# Patient Record
Sex: MALE | Race: WHITE | URBAN - NONMETROPOLITAN AREA
[De-identification: names, ages, dates, MRNs, and addresses within clinical notes are randomized per-mention and may not be internally consistent; named-entity substitution may affect disease eponyms.]

---

## 2019-12-16 ENCOUNTER — OFFICE VISIT (OUTPATIENT)
Dept: URBAN - NONMETROPOLITAN AREA CLINIC 10 | Facility: CLINIC | Age: 69
End: 2019-12-16
Payer: MEDICARE

## 2019-12-16 DIAGNOSIS — H52.4 PRESBYOPIA: ICD-10-CM

## 2019-12-16 PROCEDURE — 92004 COMPRE OPH EXAM NEW PT 1/>: CPT | Performed by: OPTOMETRIST

## 2019-12-16 ASSESSMENT — KERATOMETRY
OS: 46.00
OD: 45.25

## 2019-12-16 ASSESSMENT — INTRAOCULAR PRESSURE
OD: 14
OS: 14

## 2019-12-16 ASSESSMENT — VISUAL ACUITY
OS: 20/40
OD: 20/40

## 2019-12-16 NOTE — IMPRESSION/PLAN
Impression: Age-related nuclear cataract, bilateral: H25.13. Plan: Discussed diagnosis in detail. I recommended cataract surgery. Consult with Dr. Angelia Pate for pre-op for cataract surgery.

## 2020-02-14 ENCOUNTER — OFFICE VISIT (OUTPATIENT)
Dept: URBAN - NONMETROPOLITAN AREA CLINIC 10 | Facility: CLINIC | Age: 70
End: 2020-02-14
Payer: MEDICARE

## 2020-02-14 DIAGNOSIS — H25.13 AGE-RELATED NUCLEAR CATARACT, BILATERAL: Primary | ICD-10-CM

## 2020-02-14 PROCEDURE — 99204 OFFICE O/P NEW MOD 45 MIN: CPT | Performed by: OPHTHALMOLOGY

## 2020-02-14 RX ORDER — OFLOXACIN 3 MG/ML
0.3 % SOLUTION/ DROPS OPHTHALMIC
Qty: 5 | Refills: 1 | Status: INACTIVE
Start: 2020-02-14 | End: 2020-07-22

## 2020-02-14 RX ORDER — PREDNISOLONE ACETATE 10 MG/ML
1 % SUSPENSION/ DROPS OPHTHALMIC
Qty: 5 | Refills: 1 | Status: INACTIVE
Start: 2020-02-14 | End: 2020-06-05

## 2020-02-14 ASSESSMENT — INTRAOCULAR PRESSURE
OD: 18
OS: 17

## 2020-02-14 ASSESSMENT — KERATOMETRY: OD: 44.88

## 2020-02-14 ASSESSMENT — VISUAL ACUITY
OS: 20/50
OD: 20/50

## 2020-03-10 ENCOUNTER — Encounter (OUTPATIENT)
Dept: URBAN - NONMETROPOLITAN AREA CLINIC 6 | Facility: CLINIC | Age: 70
End: 2020-03-10
Payer: MEDICARE

## 2020-03-18 ENCOUNTER — SURGERY (OUTPATIENT)
Dept: URBAN - NONMETROPOLITAN AREA SURGERY 1 | Facility: SURGERY | Age: 70
End: 2020-03-18
Payer: MEDICARE

## 2020-03-18 PROCEDURE — 66984 XCAPSL CTRC RMVL W/O ECP: CPT | Performed by: OPHTHALMOLOGY

## 2020-03-19 ENCOUNTER — POST OP (OUTPATIENT)
Dept: URBAN - NONMETROPOLITAN AREA CLINIC 6 | Facility: CLINIC | Age: 70
End: 2020-03-19

## 2020-03-19 PROCEDURE — 99024 POSTOP FOLLOW-UP VISIT: CPT | Performed by: OPTOMETRIST

## 2020-03-19 ASSESSMENT — INTRAOCULAR PRESSURE: OS: 21

## 2020-03-24 ENCOUNTER — POST OP (OUTPATIENT)
Dept: URBAN - NONMETROPOLITAN AREA CLINIC 6 | Facility: CLINIC | Age: 70
End: 2020-03-24

## 2020-03-24 PROCEDURE — 99024 POSTOP FOLLOW-UP VISIT: CPT | Performed by: OPTOMETRIST

## 2020-03-24 ASSESSMENT — VISUAL ACUITY
OD: 20/50
OS: 20/20

## 2020-03-24 ASSESSMENT — INTRAOCULAR PRESSURE
OD: 13
OS: 14

## 2020-04-08 ENCOUNTER — POST OP (OUTPATIENT)
Dept: URBAN - NONMETROPOLITAN AREA CLINIC 6 | Facility: CLINIC | Age: 70
End: 2020-04-08

## 2020-04-08 PROCEDURE — 99024 POSTOP FOLLOW-UP VISIT: CPT | Performed by: OPHTHALMOLOGY

## 2020-04-08 ASSESSMENT — VISUAL ACUITY
OD: 20/50
OS: 20/25

## 2020-04-08 ASSESSMENT — INTRAOCULAR PRESSURE
OD: 15
OS: 16

## 2020-05-13 ENCOUNTER — SURGERY (OUTPATIENT)
Dept: URBAN - NONMETROPOLITAN AREA SURGERY 1 | Facility: SURGERY | Age: 70
End: 2020-05-13
Payer: MEDICARE

## 2020-05-13 PROCEDURE — 66984 XCAPSL CTRC RMVL W/O ECP: CPT | Performed by: OPHTHALMOLOGY

## 2020-05-14 ENCOUNTER — POST OP (OUTPATIENT)
Dept: URBAN - NONMETROPOLITAN AREA CLINIC 6 | Facility: CLINIC | Age: 70
End: 2020-05-14

## 2020-05-14 DIAGNOSIS — Z96.1 PRESENCE OF INTRAOCULAR LENS: Primary | ICD-10-CM

## 2020-05-14 PROCEDURE — 99024 POSTOP FOLLOW-UP VISIT: CPT | Performed by: OPTOMETRIST

## 2020-05-14 ASSESSMENT — INTRAOCULAR PRESSURE: OD: 22

## 2020-05-29 ENCOUNTER — POST-OPERATIVE VISIT (OUTPATIENT)
Dept: URBAN - NONMETROPOLITAN AREA CLINIC 10 | Facility: CLINIC | Age: 70
End: 2020-05-29

## 2020-05-29 DIAGNOSIS — Z09 ENCNTR FOR F/U EXAM AFT TRTMT FOR COND OTH THAN MALIG NEOPLM: Primary | ICD-10-CM

## 2020-05-29 PROCEDURE — 99024 POSTOP FOLLOW-UP VISIT: CPT | Performed by: OPTOMETRIST

## 2020-05-29 ASSESSMENT — VISUAL ACUITY
OD: 20/60
OS: 20/25

## 2020-05-29 ASSESSMENT — INTRAOCULAR PRESSURE
OD: 14
OS: 16

## 2020-06-26 ENCOUNTER — POST-OPERATIVE VISIT (OUTPATIENT)
Dept: URBAN - NONMETROPOLITAN AREA CLINIC 10 | Facility: CLINIC | Age: 70
End: 2020-06-26
Payer: MEDICARE

## 2020-06-26 PROCEDURE — 99024 POSTOP FOLLOW-UP VISIT: CPT | Performed by: OPTOMETRIST

## 2020-06-26 ASSESSMENT — INTRAOCULAR PRESSURE
OD: 8
OS: 8

## 2020-07-22 ENCOUNTER — FOLLOW UP ESTABLISHED (OUTPATIENT)
Dept: URBAN - NONMETROPOLITAN AREA CLINIC 6 | Facility: CLINIC | Age: 70
End: 2020-07-22
Payer: MEDICARE

## 2020-07-22 PROCEDURE — 92014 COMPRE OPH EXAM EST PT 1/>: CPT | Performed by: OPHTHALMOLOGY

## 2020-07-22 PROCEDURE — 92134 CPTRZ OPH DX IMG PST SGM RTA: CPT | Performed by: OPHTHALMOLOGY

## 2020-07-22 PROCEDURE — 92235 FLUORESCEIN ANGRPH MLTIFRAME: CPT | Performed by: OPHTHALMOLOGY

## 2020-07-22 RX ORDER — DICLOFENAC SODIUM 1 MG/ML
0.1 % SOLUTION/ DROPS OPHTHALMIC
Qty: 1 | Refills: 1 | Status: INACTIVE
Start: 2020-07-22 | End: 2020-09-01

## 2020-07-22 RX ORDER — PREDNISOLONE ACETATE 10 MG/ML
1 % SUSPENSION/ DROPS OPHTHALMIC
Qty: 1 | Refills: 1 | Status: INACTIVE
Start: 2020-07-22 | End: 2020-11-13

## 2020-07-22 ASSESSMENT — INTRAOCULAR PRESSURE
OS: 5
OD: 9

## 2020-08-19 ENCOUNTER — FOLLOW UP ESTABLISHED (OUTPATIENT)
Dept: URBAN - NONMETROPOLITAN AREA CLINIC 6 | Facility: CLINIC | Age: 70
End: 2020-08-19
Payer: MEDICARE

## 2020-08-19 PROCEDURE — 92014 COMPRE OPH EXAM EST PT 1/>: CPT | Performed by: OPHTHALMOLOGY

## 2020-08-19 PROCEDURE — 92134 CPTRZ OPH DX IMG PST SGM RTA: CPT | Performed by: OPHTHALMOLOGY

## 2020-08-19 ASSESSMENT — INTRAOCULAR PRESSURE
OD: 10
OS: 11

## 2020-09-16 ENCOUNTER — FOLLOW UP ESTABLISHED (OUTPATIENT)
Dept: URBAN - NONMETROPOLITAN AREA CLINIC 6 | Facility: CLINIC | Age: 70
End: 2020-09-16
Payer: MEDICARE

## 2020-09-16 PROCEDURE — 92134 CPTRZ OPH DX IMG PST SGM RTA: CPT | Performed by: OPHTHALMOLOGY

## 2020-09-16 PROCEDURE — 92014 COMPRE OPH EXAM EST PT 1/>: CPT | Performed by: OPHTHALMOLOGY

## 2020-09-16 ASSESSMENT — INTRAOCULAR PRESSURE
OS: 15
OD: 14

## 2020-11-11 ENCOUNTER — FOLLOW UP ESTABLISHED (OUTPATIENT)
Dept: URBAN - NONMETROPOLITAN AREA CLINIC 6 | Facility: CLINIC | Age: 70
End: 2020-11-11
Payer: MEDICARE

## 2020-11-11 DIAGNOSIS — H59.031 CYSTOID MACULAR EDEMA FOLLOWING CATARACT SURGERY, RIGHT EYE: Primary | ICD-10-CM

## 2020-11-11 PROCEDURE — 92014 COMPRE OPH EXAM EST PT 1/>: CPT | Performed by: OPHTHALMOLOGY

## 2020-11-11 PROCEDURE — 92134 CPTRZ OPH DX IMG PST SGM RTA: CPT | Performed by: OPHTHALMOLOGY

## 2020-11-11 ASSESSMENT — INTRAOCULAR PRESSURE
OS: 11
OD: 12

## 2020-12-09 ENCOUNTER — FOLLOW UP ESTABLISHED (OUTPATIENT)
Dept: URBAN - NONMETROPOLITAN AREA CLINIC 6 | Facility: CLINIC | Age: 70
End: 2020-12-09
Payer: MEDICARE

## 2020-12-09 PROCEDURE — 92134 CPTRZ OPH DX IMG PST SGM RTA: CPT | Performed by: OPHTHALMOLOGY

## 2020-12-09 PROCEDURE — 92014 COMPRE OPH EXAM EST PT 1/>: CPT | Performed by: OPHTHALMOLOGY

## 2020-12-09 ASSESSMENT — INTRAOCULAR PRESSURE
OD: 12
OS: 12

## 2023-07-20 ENCOUNTER — OFFICE VISIT (OUTPATIENT)
Dept: URBAN - NONMETROPOLITAN AREA CLINIC 6 | Facility: CLINIC | Age: 73
End: 2023-07-20
Payer: COMMERCIAL

## 2023-07-20 DIAGNOSIS — H35.81 RETINAL EDEMA: Primary | ICD-10-CM

## 2023-07-20 PROCEDURE — 92134 CPTRZ OPH DX IMG PST SGM RTA: CPT | Performed by: OPHTHALMOLOGY

## 2023-07-20 PROCEDURE — 92014 COMPRE OPH EXAM EST PT 1/>: CPT | Performed by: OPHTHALMOLOGY

## 2023-07-20 RX ORDER — PREDNISOLONE ACETATE 10 MG/ML
1 % SUSPENSION/ DROPS OPHTHALMIC
Qty: 5 | Refills: 1 | Status: ACTIVE
Start: 2023-07-20

## 2023-07-20 RX ORDER — DICLOFENAC SODIUM 1 MG/ML
0.1 % SOLUTION/ DROPS OPHTHALMIC
Qty: 5 | Refills: 1 | Status: ACTIVE
Start: 2023-07-20

## 2023-07-20 ASSESSMENT — INTRAOCULAR PRESSURE
OS: 14
OD: 14

## 2023-07-20 ASSESSMENT — VISUAL ACUITY
OS: 20/40
OD: 20/20

## 2023-07-20 NOTE — IMPRESSION/PLAN
Impression: Retinal edema: H35.81. Plan: Start Prednisolone and diclofenac QID OS. Order retinal consult for further evaluation. Call if any worsening.

## 2023-08-16 ENCOUNTER — OFFICE VISIT (OUTPATIENT)
Dept: URBAN - NONMETROPOLITAN AREA CLINIC 6 | Facility: CLINIC | Age: 73
End: 2023-08-16
Payer: COMMERCIAL

## 2023-08-16 DIAGNOSIS — H35.3112 NONEXUDATIVE MACULAR DEGENERATION, INTERMEDIATE DRY STAGE, RIGHT EYE: ICD-10-CM

## 2023-08-16 DIAGNOSIS — H35.3221 EXUDATIVE MACULAR DEGENERATION, WITH ACTIVE CHOROIDAL NEOVASCULARIZATION, LEFT EYE: Primary | ICD-10-CM

## 2023-08-16 PROCEDURE — 92235 FLUORESCEIN ANGRPH MLTIFRAME: CPT | Performed by: OPHTHALMOLOGY

## 2023-08-16 PROCEDURE — 99214 OFFICE O/P EST MOD 30 MIN: CPT | Performed by: OPHTHALMOLOGY

## 2023-08-16 PROCEDURE — 67028 INJECTION EYE DRUG: CPT | Performed by: OPHTHALMOLOGY

## 2023-08-16 PROCEDURE — 92134 CPTRZ OPH DX IMG PST SGM RTA: CPT | Performed by: OPHTHALMOLOGY

## 2023-08-16 ASSESSMENT — INTRAOCULAR PRESSURE
OS: 19
OD: 15

## 2023-09-13 ENCOUNTER — OFFICE VISIT (OUTPATIENT)
Dept: URBAN - NONMETROPOLITAN AREA CLINIC 6 | Facility: CLINIC | Age: 73
End: 2023-09-13
Payer: COMMERCIAL

## 2023-09-13 DIAGNOSIS — H35.3221 EXUDATIVE MACULAR DEGENERATION, WITH ACTIVE CHOROIDAL NEOVASCULARIZATION, LEFT EYE: Primary | ICD-10-CM

## 2023-09-13 PROCEDURE — 92134 CPTRZ OPH DX IMG PST SGM RTA: CPT | Performed by: OPHTHALMOLOGY

## 2023-09-13 PROCEDURE — 67028 INJECTION EYE DRUG: CPT | Performed by: OPHTHALMOLOGY

## 2023-10-11 ENCOUNTER — OFFICE VISIT (OUTPATIENT)
Dept: URBAN - NONMETROPOLITAN AREA CLINIC 6 | Facility: CLINIC | Age: 73
End: 2023-10-11
Payer: COMMERCIAL

## 2023-10-11 DIAGNOSIS — H35.3221 EXUDATIVE AGE-RELATED MACULAR DEGENERATION, LEFT EYE, WITH ACTIVE CHOROIDAL NEOVASCULARIZATION: Primary | ICD-10-CM

## 2023-10-11 PROCEDURE — 92134 CPTRZ OPH DX IMG PST SGM RTA: CPT | Performed by: OPHTHALMOLOGY

## 2023-10-11 PROCEDURE — 67028 INJECTION EYE DRUG: CPT | Performed by: OPHTHALMOLOGY

## 2023-10-11 ASSESSMENT — INTRAOCULAR PRESSURE
OD: 24
OS: 10

## 2023-11-08 ENCOUNTER — OFFICE VISIT (OUTPATIENT)
Dept: URBAN - NONMETROPOLITAN AREA CLINIC 6 | Facility: CLINIC | Age: 73
End: 2023-11-08
Payer: COMMERCIAL

## 2023-11-08 DIAGNOSIS — H35.3221 EXUDATIVE AGE-RELATED MACULAR DEGENERATION, LEFT EYE, WITH ACTIVE CHOROIDAL NEOVASCULARIZATION: Primary | ICD-10-CM

## 2023-11-08 PROCEDURE — 92235 FLUORESCEIN ANGRPH MLTIFRAME: CPT | Performed by: OPHTHALMOLOGY

## 2023-11-08 PROCEDURE — 99214 OFFICE O/P EST MOD 30 MIN: CPT | Performed by: OPHTHALMOLOGY

## 2023-11-08 PROCEDURE — 67028 INJECTION EYE DRUG: CPT | Performed by: OPHTHALMOLOGY

## 2023-11-08 PROCEDURE — 92134 CPTRZ OPH DX IMG PST SGM RTA: CPT | Performed by: OPHTHALMOLOGY

## 2023-11-08 ASSESSMENT — INTRAOCULAR PRESSURE
OD: 14
OS: 13

## 2023-12-20 ENCOUNTER — OFFICE VISIT (OUTPATIENT)
Dept: URBAN - NONMETROPOLITAN AREA CLINIC 6 | Facility: CLINIC | Age: 73
End: 2023-12-20
Payer: COMMERCIAL

## 2023-12-20 DIAGNOSIS — H35.3221 EXUDATIVE AGE-RELATED MACULAR DEGENERATION, LEFT EYE, WITH ACTIVE CHOROIDAL NEOVASCULARIZATION: Primary | ICD-10-CM

## 2023-12-20 PROCEDURE — 92134 CPTRZ OPH DX IMG PST SGM RTA: CPT | Performed by: OPHTHALMOLOGY

## 2023-12-20 PROCEDURE — 67028 INJECTION EYE DRUG: CPT | Performed by: OPHTHALMOLOGY

## 2023-12-20 ASSESSMENT — INTRAOCULAR PRESSURE
OS: 14
OD: 9

## 2024-01-31 ENCOUNTER — OFFICE VISIT (OUTPATIENT)
Dept: URBAN - NONMETROPOLITAN AREA CLINIC 6 | Facility: CLINIC | Age: 74
End: 2024-01-31
Payer: COMMERCIAL

## 2024-01-31 PROCEDURE — 67028 INJECTION EYE DRUG: CPT | Performed by: OPHTHALMOLOGY

## 2024-01-31 PROCEDURE — 92134 CPTRZ OPH DX IMG PST SGM RTA: CPT | Performed by: OPHTHALMOLOGY

## 2024-01-31 ASSESSMENT — INTRAOCULAR PRESSURE
OD: 10
OS: 10

## 2024-03-13 ENCOUNTER — OFFICE VISIT (OUTPATIENT)
Dept: URBAN - NONMETROPOLITAN AREA CLINIC 6 | Facility: CLINIC | Age: 74
End: 2024-03-13
Payer: COMMERCIAL

## 2024-03-13 DIAGNOSIS — H35.3112 NONEXUDATIVE MACULAR DEGENERATION, INTERMEDIATE DRY STAGE, RIGHT EYE: ICD-10-CM

## 2024-03-13 DIAGNOSIS — H35.3221 EXUDATIVE AGE-RELATED MACULAR DEGENERATION, LEFT EYE, WITH ACTIVE CHOROIDAL NEOVASCULARIZATION: Primary | ICD-10-CM

## 2024-03-13 PROCEDURE — 92134 CPTRZ OPH DX IMG PST SGM RTA: CPT | Performed by: OPHTHALMOLOGY

## 2024-03-13 PROCEDURE — 99214 OFFICE O/P EST MOD 30 MIN: CPT | Performed by: OPHTHALMOLOGY

## 2024-03-13 PROCEDURE — 67028 INJECTION EYE DRUG: CPT | Performed by: OPHTHALMOLOGY

## 2024-03-13 ASSESSMENT — INTRAOCULAR PRESSURE
OS: 11
OD: 11

## 2024-05-22 ENCOUNTER — OFFICE VISIT (OUTPATIENT)
Dept: URBAN - NONMETROPOLITAN AREA CLINIC 6 | Facility: CLINIC | Age: 74
End: 2024-05-22
Payer: COMMERCIAL

## 2024-05-22 DIAGNOSIS — H35.3112 NONEXUDATIVE MACULAR DEGENERATION, INTERMEDIATE DRY STAGE, RIGHT EYE: ICD-10-CM

## 2024-05-22 DIAGNOSIS — H35.3221 EXUDATIVE AGE-RELATED MACULAR DEGENERATION, LEFT EYE, WITH ACTIVE CHOROIDAL NEOVASCULARIZATION: Primary | ICD-10-CM

## 2024-05-22 PROCEDURE — 67028 INJECTION EYE DRUG: CPT | Performed by: OPHTHALMOLOGY

## 2024-05-22 PROCEDURE — 92134 CPTRZ OPH DX IMG PST SGM RTA: CPT | Performed by: OPHTHALMOLOGY

## 2024-05-22 ASSESSMENT — INTRAOCULAR PRESSURE
OD: 12
OS: 12

## 2024-07-17 ENCOUNTER — OFFICE VISIT (OUTPATIENT)
Dept: URBAN - NONMETROPOLITAN AREA CLINIC 6 | Facility: CLINIC | Age: 74
End: 2024-07-17
Payer: COMMERCIAL

## 2024-07-17 DIAGNOSIS — H35.3112 NONEXUDATIVE MACULAR DEGENERATION, INTERMEDIATE DRY STAGE, RIGHT EYE: ICD-10-CM

## 2024-07-17 DIAGNOSIS — H35.3221 EXUDATIVE AGE-RELATED MACULAR DEGENERATION, LEFT EYE, WITH ACTIVE CHOROIDAL NEOVASCULARIZATION: Primary | ICD-10-CM

## 2024-07-17 PROCEDURE — 92134 CPTRZ OPH DX IMG PST SGM RTA: CPT | Performed by: OPHTHALMOLOGY

## 2024-07-17 PROCEDURE — 67028 INJECTION EYE DRUG: CPT | Performed by: OPHTHALMOLOGY

## 2024-07-17 ASSESSMENT — INTRAOCULAR PRESSURE
OD: 14
OS: 16

## 2024-09-11 ENCOUNTER — OFFICE VISIT (OUTPATIENT)
Dept: URBAN - NONMETROPOLITAN AREA CLINIC 6 | Facility: CLINIC | Age: 74
End: 2024-09-11
Payer: COMMERCIAL

## 2024-09-11 DIAGNOSIS — H35.3221 EXUDATIVE AGE-RELATED MACULAR DEGENERATION, LEFT EYE, WITH ACTIVE CHOROIDAL NEOVASCULARIZATION: Primary | ICD-10-CM

## 2024-09-11 DIAGNOSIS — H35.3112 NONEXUDATIVE MACULAR DEGENERATION, INTERMEDIATE DRY STAGE, RIGHT EYE: ICD-10-CM

## 2024-09-11 PROCEDURE — 67028 INJECTION EYE DRUG: CPT | Performed by: OPHTHALMOLOGY

## 2024-09-11 PROCEDURE — 92235 FLUORESCEIN ANGRPH MLTIFRAME: CPT | Performed by: OPHTHALMOLOGY

## 2024-09-11 PROCEDURE — 92134 CPTRZ OPH DX IMG PST SGM RTA: CPT | Performed by: OPHTHALMOLOGY

## 2024-09-11 PROCEDURE — 99214 OFFICE O/P EST MOD 30 MIN: CPT | Performed by: OPHTHALMOLOGY

## 2024-09-11 ASSESSMENT — INTRAOCULAR PRESSURE
OS: 11
OD: 10

## 2024-11-20 ENCOUNTER — OFFICE VISIT (OUTPATIENT)
Dept: URBAN - NONMETROPOLITAN AREA CLINIC 6 | Facility: CLINIC | Age: 74
End: 2024-11-20
Payer: COMMERCIAL

## 2024-11-20 DIAGNOSIS — H35.3112 NONEXUDATIVE MACULAR DEGENERATION, INTERMEDIATE DRY STAGE, RIGHT EYE: ICD-10-CM

## 2024-11-20 DIAGNOSIS — H35.3221 EXUDATIVE AGE-RELATED MACULAR DEGENERATION, LEFT EYE, WITH ACTIVE CHOROIDAL NEOVASCULARIZATION: Primary | ICD-10-CM

## 2024-11-20 PROCEDURE — 92134 CPTRZ OPH DX IMG PST SGM RTA: CPT | Performed by: OPHTHALMOLOGY

## 2024-11-20 PROCEDURE — 67028 INJECTION EYE DRUG: CPT | Performed by: OPHTHALMOLOGY

## 2024-11-20 ASSESSMENT — INTRAOCULAR PRESSURE
OD: 15
OS: 12

## 2025-01-29 ENCOUNTER — OFFICE VISIT (OUTPATIENT)
Dept: URBAN - NONMETROPOLITAN AREA CLINIC 6 | Facility: CLINIC | Age: 75
End: 2025-01-29
Payer: COMMERCIAL

## 2025-01-29 DIAGNOSIS — H35.3221 EXUDATIVE AGE-RELATED MACULAR DEGENERATION, LEFT EYE, WITH ACTIVE CHOROIDAL NEOVASCULARIZATION: Primary | ICD-10-CM

## 2025-01-29 DIAGNOSIS — H35.3112 NONEXUDATIVE MACULAR DEGENERATION, INTERMEDIATE DRY STAGE, RIGHT EYE: ICD-10-CM

## 2025-01-29 PROCEDURE — 92134 CPTRZ OPH DX IMG PST SGM RTA: CPT | Performed by: OPHTHALMOLOGY

## 2025-01-29 PROCEDURE — 67028 INJECTION EYE DRUG: CPT | Performed by: OPHTHALMOLOGY

## 2025-01-29 ASSESSMENT — INTRAOCULAR PRESSURE
OS: 13
OD: 14

## 2025-04-09 ENCOUNTER — OFFICE VISIT (OUTPATIENT)
Dept: URBAN - NONMETROPOLITAN AREA CLINIC 6 | Facility: CLINIC | Age: 75
End: 2025-04-09
Payer: COMMERCIAL

## 2025-04-09 DIAGNOSIS — H35.3221 EXUDATIVE AGE-RELATED MACULAR DEGENERATION, LEFT EYE, WITH ACTIVE CHOROIDAL NEOVASCULARIZATION: Primary | ICD-10-CM

## 2025-04-09 DIAGNOSIS — H35.3112 NONEXUDATIVE MACULAR DEGENERATION, INTERMEDIATE DRY STAGE, RIGHT EYE: ICD-10-CM

## 2025-04-09 PROCEDURE — 67028 INJECTION EYE DRUG: CPT | Performed by: OPHTHALMOLOGY

## 2025-04-09 PROCEDURE — 92134 CPTRZ OPH DX IMG PST SGM RTA: CPT | Performed by: OPHTHALMOLOGY

## 2025-04-09 ASSESSMENT — INTRAOCULAR PRESSURE
OD: 12
OS: 14

## 2025-06-18 ENCOUNTER — OFFICE VISIT (OUTPATIENT)
Dept: URBAN - NONMETROPOLITAN AREA CLINIC 6 | Facility: CLINIC | Age: 75
End: 2025-06-18
Payer: COMMERCIAL

## 2025-06-18 DIAGNOSIS — H35.3221 EXUDATIVE AGE-RELATED MACULAR DEGENERATION, LEFT EYE, WITH ACTIVE CHOROIDAL NEOVASCULARIZATION: Primary | ICD-10-CM

## 2025-06-18 DIAGNOSIS — H35.3112 NONEXUDATIVE MACULAR DEGENERATION, INTERMEDIATE DRY STAGE, RIGHT EYE: ICD-10-CM

## 2025-06-18 PROCEDURE — 67028 INJECTION EYE DRUG: CPT | Performed by: OPHTHALMOLOGY

## 2025-06-18 PROCEDURE — 99214 OFFICE O/P EST MOD 30 MIN: CPT | Performed by: OPHTHALMOLOGY

## 2025-06-18 PROCEDURE — 92134 CPTRZ OPH DX IMG PST SGM RTA: CPT | Performed by: OPHTHALMOLOGY

## 2025-06-18 PROCEDURE — 92235 FLUORESCEIN ANGRPH MLTIFRAME: CPT | Performed by: OPHTHALMOLOGY

## 2025-06-18 ASSESSMENT — INTRAOCULAR PRESSURE
OS: 11
OD: 8